# Patient Record
Sex: MALE | URBAN - METROPOLITAN AREA
[De-identification: names, ages, dates, MRNs, and addresses within clinical notes are randomized per-mention and may not be internally consistent; named-entity substitution may affect disease eponyms.]

---

## 2019-09-26 ENCOUNTER — OFFICE VISIT (OUTPATIENT)
Dept: URBAN - METROPOLITAN AREA CLINIC 23 | Facility: CLINIC | Age: 24
End: 2019-09-26
Payer: COMMERCIAL

## 2019-09-26 PROCEDURE — 99203 OFFICE O/P NEW LOW 30 MIN: CPT | Performed by: OPTOMETRIST

## 2019-09-26 RX ORDER — OFLOXACIN 3 MG/ML
0.3 % SOLUTION/ DROPS OPHTHALMIC
Qty: 1 | Refills: 1 | Status: ACTIVE
Start: 2019-09-26

## 2019-09-26 NOTE — IMPRESSION/PLAN
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 Plan: Discussed diagnosis in detail with patient. Emphasized and explained compliance. Will continue to observe condition and or symptoms. New medication(s) Rx given today. Ofloxacin 1 gtt QID QID x 1 week. Patient dilated with 1 gtt atropine 1% in office. Bandage cl applied. -.50 acuvue oasys 8.4 bc. Patient will return  to have cl removed and f/u.

## 2019-09-30 ENCOUNTER — OFFICE VISIT (OUTPATIENT)
Dept: URBAN - METROPOLITAN AREA CLINIC 23 | Facility: CLINIC | Age: 24
End: 2019-09-30
Payer: COMMERCIAL

## 2019-09-30 DIAGNOSIS — H18.832 RECURRENT EROSION OF CORNEA, LEFT EYE: Primary | ICD-10-CM

## 2019-09-30 PROCEDURE — 99213 OFFICE O/P EST LOW 20 MIN: CPT | Performed by: OPTOMETRIST

## 2019-09-30 NOTE — IMPRESSION/PLAN
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 Plan: Discussed diagnosis in detail with patient. Emphasized and explained compliance. Will continue to observe condition and or symptoms. Continue Ofloxacin 1 gtt QID QID x 1 week then d/c.   Removed  Bandage CL